# Patient Record
Sex: MALE | Race: WHITE | Employment: UNEMPLOYED | URBAN - METROPOLITAN AREA
[De-identification: names, ages, dates, MRNs, and addresses within clinical notes are randomized per-mention and may not be internally consistent; named-entity substitution may affect disease eponyms.]

---

## 2020-05-27 ENCOUNTER — HOSPITAL ENCOUNTER (EMERGENCY)
Facility: CLINIC | Age: 35
Discharge: OTHER INSTITUTION | End: 2020-05-27
Attending: FAMILY MEDICINE | Admitting: FAMILY MEDICINE
Payer: MEDICAID

## 2020-05-27 ENCOUNTER — TELEPHONE (OUTPATIENT)
Dept: BEHAVIORAL HEALTH | Facility: CLINIC | Age: 35
End: 2020-05-27

## 2020-05-27 ENCOUNTER — HOSPITAL ENCOUNTER (INPATIENT)
Facility: HOSPITAL | Age: 35
LOS: 5 days | Discharge: HOME | End: 2020-06-01
Attending: PSYCHIATRY & NEUROLOGY | Admitting: PSYCHIATRY & NEUROLOGY
Payer: MEDICAID

## 2020-05-27 VITALS
SYSTOLIC BLOOD PRESSURE: 159 MMHG | HEART RATE: 74 BPM | DIASTOLIC BLOOD PRESSURE: 100 MMHG | RESPIRATION RATE: 16 BRPM | TEMPERATURE: 98.1 F | OXYGEN SATURATION: 100 %

## 2020-05-27 DIAGNOSIS — R45.851 SUICIDAL IDEATION: ICD-10-CM

## 2020-05-27 DIAGNOSIS — F41.1 GAD (GENERALIZED ANXIETY DISORDER): Primary | ICD-10-CM

## 2020-05-27 DIAGNOSIS — F22 DELUSION (H): ICD-10-CM

## 2020-05-27 DIAGNOSIS — F32.A DEPRESSION, UNSPECIFIED DEPRESSION TYPE: ICD-10-CM

## 2020-05-27 DIAGNOSIS — F41.9 ANXIETY: ICD-10-CM

## 2020-05-27 PROBLEM — R45.89 SUICIDAL BEHAVIOR: Status: ACTIVE | Noted: 2020-05-27

## 2020-05-27 LAB
ALBUMIN SERPL-MCNC: 4.2 G/DL (ref 3.4–5)
ALP SERPL-CCNC: 66 U/L (ref 40–150)
ALT SERPL W P-5'-P-CCNC: 23 U/L (ref 0–70)
AMPHETAMINES UR QL SCN: NEGATIVE
ANION GAP SERPL CALCULATED.3IONS-SCNC: 7 MMOL/L (ref 3–14)
APAP SERPL-MCNC: <2 MG/L (ref 10–20)
AST SERPL W P-5'-P-CCNC: 12 U/L (ref 0–45)
BARBITURATES UR QL: NEGATIVE
BASOPHILS # BLD AUTO: 0 10E9/L (ref 0–0.2)
BASOPHILS NFR BLD AUTO: 0.1 %
BENZODIAZ UR QL: NEGATIVE
BILIRUB SERPL-MCNC: 0.4 MG/DL (ref 0.2–1.3)
BUN SERPL-MCNC: 15 MG/DL (ref 7–30)
CALCIUM SERPL-MCNC: 9.3 MG/DL (ref 8.5–10.1)
CANNABINOIDS UR QL SCN: NEGATIVE
CHLORIDE SERPL-SCNC: 108 MMOL/L (ref 94–109)
CO2 SERPL-SCNC: 24 MMOL/L (ref 20–32)
COCAINE UR QL: NEGATIVE
CREAT SERPL-MCNC: 0.75 MG/DL (ref 0.66–1.25)
DIFFERENTIAL METHOD BLD: ABNORMAL
EOSINOPHIL # BLD AUTO: 0.2 10E9/L (ref 0–0.7)
EOSINOPHIL NFR BLD AUTO: 1.2 %
ERYTHROCYTE [DISTWIDTH] IN BLOOD BY AUTOMATED COUNT: 12.8 % (ref 10–15)
ETHANOL SERPL-MCNC: <0.01 G/DL
ETHANOL UR QL SCN: NEGATIVE
GFR SERPL CREATININE-BSD FRML MDRD: >90 ML/MIN/{1.73_M2}
GLUCOSE SERPL-MCNC: 114 MG/DL (ref 70–99)
HCT VFR BLD AUTO: 46.8 % (ref 40–53)
HGB BLD-MCNC: 16.5 G/DL (ref 13.3–17.7)
IMM GRANULOCYTES # BLD: 0 10E9/L (ref 0–0.4)
IMM GRANULOCYTES NFR BLD: 0.2 %
LYMPHOCYTES # BLD AUTO: 1.9 10E9/L (ref 0.8–5.3)
LYMPHOCYTES NFR BLD AUTO: 14.1 %
MCH RBC QN AUTO: 32.5 PG (ref 26.5–33)
MCHC RBC AUTO-ENTMCNC: 35.3 G/DL (ref 31.5–36.5)
MCV RBC AUTO: 92 FL (ref 78–100)
MONOCYTES # BLD AUTO: 1 10E9/L (ref 0–1.3)
MONOCYTES NFR BLD AUTO: 7.9 %
NEUTROPHILS # BLD AUTO: 10 10E9/L (ref 1.6–8.3)
NEUTROPHILS NFR BLD AUTO: 76.5 %
NRBC # BLD AUTO: 0 10*3/UL
NRBC BLD AUTO-RTO: 0 /100
OPIATES UR QL SCN: NEGATIVE
PLATELET # BLD AUTO: 310 10E9/L (ref 150–450)
POTASSIUM SERPL-SCNC: 3.7 MMOL/L (ref 3.4–5.3)
PROT SERPL-MCNC: 7.4 G/DL (ref 6.8–8.8)
RBC # BLD AUTO: 5.08 10E12/L (ref 4.4–5.9)
SALICYLATES SERPL-MCNC: 4 MG/DL
SARS-COV-2 PCR COMMENT: NORMAL
SARS-COV-2 RNA SPEC QL NAA+PROBE: NEGATIVE
SARS-COV-2 RNA SPEC QL NAA+PROBE: NORMAL
SODIUM SERPL-SCNC: 139 MMOL/L (ref 133–144)
SPECIMEN SOURCE: NORMAL
SPECIMEN SOURCE: NORMAL
WBC # BLD AUTO: 13.1 10E9/L (ref 4–11)

## 2020-05-27 PROCEDURE — 99284 EMERGENCY DEPT VISIT MOD MDM: CPT | Mod: Z6 | Performed by: FAMILY MEDICINE

## 2020-05-27 PROCEDURE — 80053 COMPREHEN METABOLIC PANEL: CPT | Performed by: FAMILY MEDICINE

## 2020-05-27 PROCEDURE — 80307 DRUG TEST PRSMV CHEM ANLYZR: CPT | Performed by: FAMILY MEDICINE

## 2020-05-27 PROCEDURE — 80320 DRUG SCREEN QUANTALCOHOLS: CPT | Performed by: FAMILY MEDICINE

## 2020-05-27 PROCEDURE — 12400000 ZZH R&B MH

## 2020-05-27 PROCEDURE — U0003 INFECTIOUS AGENT DETECTION BY NUCLEIC ACID (DNA OR RNA); SEVERE ACUTE RESPIRATORY SYNDROME CORONAVIRUS 2 (SARS-COV-2) (CORONAVIRUS DISEASE [COVID-19]), AMPLIFIED PROBE TECHNIQUE, MAKING USE OF HIGH THROUGHPUT TECHNOLOGIES AS DESCRIBED BY CMS-2020-01-R: HCPCS | Performed by: FAMILY MEDICINE

## 2020-05-27 PROCEDURE — 99285 EMERGENCY DEPT VISIT HI MDM: CPT | Mod: 25 | Performed by: FAMILY MEDICINE

## 2020-05-27 PROCEDURE — 25000132 ZZH RX MED GY IP 250 OP 250 PS 637: Performed by: NURSE PRACTITIONER

## 2020-05-27 PROCEDURE — 80329 ANALGESICS NON-OPIOID 1 OR 2: CPT | Performed by: FAMILY MEDICINE

## 2020-05-27 PROCEDURE — 85025 COMPLETE CBC W/AUTO DIFF WBC: CPT | Performed by: FAMILY MEDICINE

## 2020-05-27 PROCEDURE — 80329 ANALGESICS NON-OPIOID 1 OR 2: CPT | Mod: 91 | Performed by: FAMILY MEDICINE

## 2020-05-27 PROCEDURE — 90791 PSYCH DIAGNOSTIC EVALUATION: CPT

## 2020-05-27 PROCEDURE — 25000132 ZZH RX MED GY IP 250 OP 250 PS 637: Performed by: FAMILY MEDICINE

## 2020-05-27 RX ORDER — OLANZAPINE 10 MG/2ML
5-10 INJECTION, POWDER, FOR SOLUTION INTRAMUSCULAR 3 TIMES DAILY PRN
Status: DISCONTINUED | OUTPATIENT
Start: 2020-05-27 | End: 2020-06-01 | Stop reason: HOSPADM

## 2020-05-27 RX ORDER — ACETAMINOPHEN 325 MG/1
650 TABLET ORAL EVERY 4 HOURS PRN
Status: DISCONTINUED | OUTPATIENT
Start: 2020-05-27 | End: 2020-06-01 | Stop reason: HOSPADM

## 2020-05-27 RX ORDER — OLANZAPINE 5 MG/1
5-10 TABLET ORAL 3 TIMES DAILY PRN
Status: DISCONTINUED | OUTPATIENT
Start: 2020-05-27 | End: 2020-06-01 | Stop reason: HOSPADM

## 2020-05-27 RX ORDER — OLANZAPINE 10 MG/1
10 TABLET, ORALLY DISINTEGRATING ORAL ONCE
Status: COMPLETED | OUTPATIENT
Start: 2020-05-27 | End: 2020-05-27

## 2020-05-27 RX ORDER — HYDROXYZINE HYDROCHLORIDE 25 MG/1
25-50 TABLET, FILM COATED ORAL EVERY 4 HOURS PRN
Status: DISCONTINUED | OUTPATIENT
Start: 2020-05-27 | End: 2020-06-01 | Stop reason: HOSPADM

## 2020-05-27 RX ORDER — ALUMINA, MAGNESIA, AND SIMETHICONE 2400; 2400; 240 MG/30ML; MG/30ML; MG/30ML
30 SUSPENSION ORAL EVERY 4 HOURS PRN
Status: DISCONTINUED | OUTPATIENT
Start: 2020-05-27 | End: 2020-06-01 | Stop reason: HOSPADM

## 2020-05-27 RX ADMIN — HYDROXYZINE HYDROCHLORIDE 50 MG: 25 TABLET ORAL at 23:27

## 2020-05-27 RX ADMIN — OLANZAPINE 10 MG: 10 TABLET, ORALLY DISINTEGRATING ORAL at 16:31

## 2020-05-27 ASSESSMENT — ACTIVITIES OF DAILY LIVING (ADL)
COGNITION: 0 - NO COGNITION ISSUES REPORTED
DRESS: 0-->INDEPENDENT
FALL_HISTORY_WITHIN_LAST_SIX_MONTHS: NO
TOILETING: 0-->INDEPENDENT
AMBULATION: 0-->INDEPENDENT
TRANSFERRING: 0-->INDEPENDENT
RETIRED_COMMUNICATION: 0-->UNDERSTANDS/COMMUNICATES WITHOUT DIFFICULTY
BATHING: 0-->INDEPENDENT
RETIRED_EATING: 0-->INDEPENDENT
SWALLOWING: 0-->SWALLOWS FOODS/LIQUIDS WITHOUT DIFFICULTY

## 2020-05-27 ASSESSMENT — MIFFLIN-ST. JEOR: SCORE: 1587.63

## 2020-05-27 ASSESSMENT — ENCOUNTER SYMPTOMS
DYSPHORIC MOOD: 1
COLOR CHANGE: 1

## 2020-05-27 NOTE — ED NOTES
Pt given 72hr hold form. Pt was unhappy, stating he's been in the room for hours and no one has spoken to him. Nurse reminded him that DEC  had spoken with him on iPad and MD had also seen him. Pt states understanding of rights under 72hr hold.

## 2020-05-27 NOTE — ED NOTES
Pt states that he thinks he has been sick for three months. Pt states the palms of his hands have been discolored and pt believes he is losing pigment on his face. Nurse noted palms did look mildly blotchy, but nothing outside of normal skin variation, and no discoloration or loss of pigment noted on face. Pt feels distraught and believes he is slowly dying. Pt states no n/v, and no problems with elimination. Emotionally he has been feeling depressed and embarrassed by the percieved illness.

## 2020-05-27 NOTE — ED TRIAGE NOTES
Patient has been Suicdal since sunday , took 40 tabs of advil at noon last sunday ( 5/24/20) in an attempt to end his life. Patients sister called a crisis hotline and they recommended patient to come to the ED for eval. Patient claims he is less suicidal now.

## 2020-05-27 NOTE — ED PROVIDER NOTES
"    [facility] EMERGENCY DEPARTMENT ([facility])     May 27, 2020  History     Chief Complaint   Patient presents with     Suicidal     Patient has been Suicdal since sunday , took 40 tabs of advil at noon last sunday ( 5/24/20) in an attempt to end his life. Patient also attempted to hang self.      The history is provided by the patient, a parent and medical records.     John R Budinger is a 34 year old male with a medical history significant for anxiety who presents to the ED today for suicidal ideations.  Patient's family reportedly brought him him as they were worried for his wellbeing.  Patient reportedly has no formal mental health history, however he had some anxiety in his early 20s but never sought treatment and it went away.  Patient reports that he is fearful that he is very sick and fearful that he is \"rotting\".  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  Patient was checked with blood work and nothing was found.  Patient then started noticing red hands and a priscilla on his face so he went to [facility] where they did not find anything significant.  [facility] encouraged anti-anxiety medication, but patient does not have insurance.  Patient feels he is dying and needs to figure out what is wrong with him in order to get better.  Patient recently decided he would take matters into his own hands and hung himself in his garage, but after kicking the chair out from underneath him, the rope untied.  Patient then tried to overdose on Tylenol unsuccessfully.  Patient has a history of smoking marijuana daily, however he reports he has not smoked for a month.  Patient does not want admission.  Patient would not give names and numbers of his family for collateral.    I have reviewed the Medications, Allergies, Past Medical and Surgical History, and Social History in the Personally system.  PAST MEDICAL HISTORY:   Past Medical " "History:   Diagnosis Date     Anxiety        PAST SURGICAL HISTORY: History reviewed. No pertinent surgical history.    Past medical history, past surgical history, medications, and allergies were reviewed with the patient. Additional pertinent items: None    FAMILY HISTORY: No family history on file.    SOCIAL HISTORY:   Social History     Tobacco Use     Smoking status: Current Every Day Smoker     Packs/day: 1.00     Smokeless tobacco: Never Used   Substance Use Topics     Alcohol use: Not Currently     Social history was reviewed with the patient. Additional pertinent items: None      There are no discharge medications for this patient.       No Known Allergies     Review of Systems   Skin: Positive for color change (\"red\" hands).   Psychiatric/Behavioral: Positive for behavioral problems, dysphoric mood, self-injury and suicidal ideas.   All other systems reviewed and are negative.    A complete review of systems was performed with pertinent positives and negatives noted in the HPI, and all other systems negative.       Physical Exam   BP: (!) 172/109  Pulse: 74  Heart Rate: 88  Temp: 96.8  F (36  C)  Resp: 16  SpO2: 100 %      Physical Exam  Constitutional:       General: He is not in acute distress.     Appearance: He is not diaphoretic.   HENT:      Head: Atraumatic.   Eyes:      General: No scleral icterus.     Pupils: Pupils are equal, round, and reactive to light.   Cardiovascular:      Heart sounds: Normal heart sounds.   Pulmonary:      Effort: No respiratory distress.      Breath sounds: Normal breath sounds.   Abdominal:      General: Bowel sounds are normal.      Palpations: Abdomen is soft.      Tenderness: There is no abdominal tenderness.   Musculoskeletal:         General: No tenderness.   Skin:     General: Skin is warm.      Findings: No rash.   Neurological:      General: No focal deficit present.      Mental Status: He is oriented to person, place, and time.      Cranial Nerves: No cranial " nerve deficit.      Motor: No weakness.      Coordination: Coordination normal.   Psychiatric:         Mood and Affect: Mood is anxious and depressed.         Behavior: Behavior is cooperative.         Thought Content: Thought content is paranoid and delusional. Thought content includes suicidal ideation. Thought content includes suicidal plan.         ED Course   12:10 PM  The patient was seen and examined by Giancarlo Rodriguez MD in Room ED16C.        Procedures       Patient was seen and evaluated by the  please refer to their documentation in the epic chart dated 5/27/2020                Results for orders placed or performed during the hospital encounter of 05/27/20 (from the past 24 hour(s))   CBC with platelets differential   Result Value Ref Range    WBC 13.1 (H) 4.0 - 11.0 10e9/L    RBC Count 5.08 4.4 - 5.9 10e12/L    Hemoglobin 16.5 13.3 - 17.7 g/dL    Hematocrit 46.8 40.0 - 53.0 %    MCV 92 78 - 100 fl    MCH 32.5 26.5 - 33.0 pg    MCHC 35.3 31.5 - 36.5 g/dL    RDW 12.8 10.0 - 15.0 %    Platelet Count 310 150 - 450 10e9/L    Diff Method Automated Method     % Neutrophils 76.5 %    % Lymphocytes 14.1 %    % Monocytes 7.9 %    % Eosinophils 1.2 %    % Basophils 0.1 %    % Immature Granulocytes 0.2 %    Nucleated RBCs 0 0 /100    Absolute Neutrophil 10.0 (H) 1.6 - 8.3 10e9/L    Absolute Lymphocytes 1.9 0.8 - 5.3 10e9/L    Absolute Monocytes 1.0 0.0 - 1.3 10e9/L    Absolute Eosinophils 0.2 0.0 - 0.7 10e9/L    Absolute Basophils 0.0 0.0 - 0.2 10e9/L    Abs Immature Granulocytes 0.0 0 - 0.4 10e9/L    Absolute Nucleated RBC 0.0    Comprehensive metabolic panel   Result Value Ref Range    Sodium 139 133 - 144 mmol/L    Potassium 3.7 3.4 - 5.3 mmol/L    Chloride 108 94 - 109 mmol/L    Carbon Dioxide 24 20 - 32 mmol/L    Anion Gap 7 3 - 14 mmol/L    Glucose 114 (H) 70 - 99 mg/dL    Urea Nitrogen 15 7 - 30 mg/dL    Creatinine 0.75 0.66 - 1.25 mg/dL    GFR Estimate >90 >60 mL/min/[1.73_m2]    GFR Estimate If  Black >90 >60 mL/min/[1.73_m2]    Calcium 9.3 8.5 - 10.1 mg/dL    Bilirubin Total 0.4 0.2 - 1.3 mg/dL    Albumin 4.2 3.4 - 5.0 g/dL    Protein Total 7.4 6.8 - 8.8 g/dL    Alkaline Phosphatase 66 40 - 150 U/L    ALT 23 0 - 70 U/L    AST 12 0 - 45 U/L   Acetaminophen level   Result Value Ref Range    Acetaminophen Level <2 mg/L   Salicylate level   Result Value Ref Range    Salicylate Level 4 mg/dL   Alcohol ethyl   Result Value Ref Range    Ethanol g/dL <0.01 <0.01 g/dL   Drug abuse screen 6 urine (chem dep)   Result Value Ref Range    Amphetamine Qual Urine Negative NEG^Negative    Barbiturates Qual Urine Negative NEG^Negative    Benzodiazepine Qual Urine Negative NEG^Negative    Cannabinoids Qual Urine Negative NEG^Negative    Cocaine Qual Urine Negative NEG^Negative    Ethanol Qual Urine Negative NEG^Negative    Opiates Qualitative Urine Negative NEG^Negative   Asymptomatic COVID-19 Virus (Coronavirus) by PCR    Specimen: Nasopharyngeal   Result Value Ref Range    COVID-19 Virus PCR to [facility] - Source Nasopharyngeal     COVID-19 Virus PCR to [facility] - Result       Test received-See reflex to IDDL test SARS CoV2 (COVID-19) Virus RT-PCR   SARS-CoV-2 COVID-19 Virus (Coronavirus) RT-PCR Nasopharyngeal    Specimen: Nasopharyngeal   Result Value Ref Range    SARS-CoV-2 Virus Specimen Source Nasopharyngeal     SARS-CoV-2 PCR Result NEGATIVE     SARS-CoV-2 PCR Comment       This automated, real-time RT-PCR assay by [employer] on the [employer] has   been given Emergency Use Authorization (EUA) for the in vitro qualitative detection of RNA   from the SARS-CoV2 virus in nasopharyngeal swabs in viral transport medium from patients   with signs and symptoms of infection who are suspected of COVID-19. The performance is   unknown in asymptomatic patients.       Medications   OLANZapine zydis (zyPREXA) ODT tab 10 mg (10 mg Oral Given 5/27/20 8784)             Assessments & Plan (with Medical Decision Making)          I have reviewed the nursing notes.    I have reviewed the findings, diagnosis, plan and need for follow up with the patient.    Patient with fixed delusions ongoing depression anxiety suicidal statements patient will be placed on a 72-hour hold and will be admitted unfortunately there are no beds at [facility] patient will be transported to [facility] for further evaluation and stabilization.    Final diagnoses:   Delusion (H)   Suicidal ideation   Depression, unspecified depression type   Anxiety   I, Hang Almanzar, am serving as a trained medical scribe to document services personally performed by Giancarlo Rodriguez MD, based on the provider's statements to me.     I, Giancarlo Rodriguez MD, was physically present and have reviewed and verified the accuracy of this note documented by Hang Almanzar.      5/27/2020   [facility] [place], EMERGENCY DEPARTMENT     Giancarlo Rodriguez MD  05/28/20 1000

## 2020-05-27 NOTE — TELEPHONE ENCOUNTER
S:  Pt was directed to the ED by [facility]IS due to anxiety and possible delusions.    B:  Info per Lion of [facility] , [phone] :  Lion spoke with pt over the phone today due to pts very high anxiety and possible delusional thinking.  Pt states he is very worried about numerous perceived medical issues.  He has been to ER's several times re this and nothing has been found.  He reports he tried to kill himself twice on Sunday.  He 1 st tried to hang himself and the rope failed.  He reports he fell and hit his head and passed out.  When he awoke, he states he took 40 Advil.  He denies any previous MH issues.  This has been occurring over the last 3 months or so.    A:  Needing further assessment.    R:  Directed to the ED.
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
## 2020-05-28 PROCEDURE — 99223 1ST HOSP IP/OBS HIGH 75: CPT | Mod: GT | Performed by: NURSE PRACTITIONER

## 2020-05-28 PROCEDURE — 12400000 ZZH R&B MH

## 2020-05-28 PROCEDURE — 99221 1ST HOSP IP/OBS SF/LOW 40: CPT | Mod: GT | Performed by: NURSE PRACTITIONER

## 2020-05-28 PROCEDURE — 25000132 ZZH RX MED GY IP 250 OP 250 PS 637: Performed by: NURSE PRACTITIONER

## 2020-05-28 RX ORDER — TRIAMCINOLONE ACETONIDE 0.25 MG/G
CREAM TOPICAL 2 TIMES DAILY
Status: ON HOLD | COMMUNITY
Start: 2020-05-08 | End: 2020-06-01

## 2020-05-28 RX ORDER — TRIAMCINOLONE ACETONIDE 0.25 MG/G
CREAM TOPICAL 2 TIMES DAILY PRN
Status: DISCONTINUED | OUTPATIENT
Start: 2020-05-28 | End: 2020-05-28

## 2020-05-28 RX ORDER — MINERAL OIL/HYDROPHIL PETROLAT
OINTMENT (GRAM) TOPICAL 2 TIMES DAILY PRN
Status: DISCONTINUED | OUTPATIENT
Start: 2020-05-28 | End: 2020-06-01 | Stop reason: HOSPADM

## 2020-05-28 RX ORDER — HYDROXYZINE PAMOATE 25 MG/1
25 CAPSULE ORAL EVERY 6 HOURS PRN
Status: ON HOLD | COMMUNITY
Start: 2020-05-05 | End: 2020-06-01

## 2020-05-28 RX ORDER — QUETIAPINE FUMARATE 25 MG/1
25-50 TABLET, FILM COATED ORAL 3 TIMES DAILY PRN
Status: DISCONTINUED | OUTPATIENT
Start: 2020-05-28 | End: 2020-06-01 | Stop reason: HOSPADM

## 2020-05-28 RX ORDER — PROPRANOLOL HYDROCHLORIDE 10 MG/1
10 TABLET ORAL 3 TIMES DAILY PRN
Status: DISCONTINUED | OUTPATIENT
Start: 2020-05-28 | End: 2020-06-01 | Stop reason: HOSPADM

## 2020-05-28 RX ADMIN — QUETIAPINE FUMARATE 50 MG: 25 TABLET ORAL at 18:24

## 2020-05-28 ASSESSMENT — ACTIVITIES OF DAILY LIVING (ADL)
LAUNDRY: UNABLE TO COMPLETE
HYGIENE/GROOMING: INDEPENDENT
ORAL_HYGIENE: INDEPENDENT
DRESS: INDEPENDENT
ORAL_HYGIENE: INDEPENDENT
DRESS: INDEPENDENT;SCRUBS (BEHAVIORAL HEALTH)
HYGIENE/GROOMING: INDEPENDENT
LAUNDRY: UNABLE TO COMPLETE
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
## 2020-05-28 NOTE — PLAN OF CARE
Spoke with pt's sister, Katiuska, who called for an update - Katiuska is on pt's ALAN. Updated was provided and Katiuska reported that pt's anxiety has gotten worse over the past few months and is unsure if covid has made it worse. Katiuska said pt has been getting upset over nobody believing him about his medical concerns. Katiuska did not have other concerns at this time for pt.

## 2020-05-28 NOTE — PLAN OF CARE
Face to face shift report received from [place]. Rounding completed, pt observed.    Problem: Adult Behavioral Health Plan of Care  Goal: Patient-Specific Goal (Individualization)  Description: Pt will sleep 6-8 hours each night.   Pt will participate in at least 50% of groups.  Pt will eat at least 50% of meals.  Pt will remain compliant with assessments.  5/28/2020 1548 by Mannie Melo RN  Outcome: No Change  Note: Patient is calm and cooperative. At the start of shift patient was in group room. Patient has ongoing anxiety about his thyroid. Thinking he has cancer in his thyroid. Patient ate well. Is compliant with treatment team and medications. Attended groups all shift. Had a talk with him about different thyroid conditions that seemed to have a slight calming effect.     Problem: Suicide Risk  Goal: Absence of Self-Harm  Description: Pt will remain free of self harm.  5/28/2020 1548 by Mannie Melo RN  Outcome: Improving  Patient was free from injury/harm this shift.     Face to face report will be communicated to oncoming RN.    Mannie Melo RN  5/28/2020  3:49 PM

## 2020-05-28 NOTE — PLAN OF CARE
"ADMISSION NOTE    Reason for admission suicide attempt.  Safety concerns none.  Risk for or history of violence none reported.   Full skin assessment: WDL    Patient arrived on unit from [facility] accompanied by EMS staff on 5/27/2020  8:30 PM.   Status on arrival: cooperative,  BP (!) 148/108   Pulse 78   Temp 97.7  F (36.5  C) (Tympanic)   Resp 16   Ht 1.727 m (5' 8\")   Wt 67.8 kg (149 lb 8 oz)   SpO2 99%   BMI 22.73 kg/m    Patient given tour of unit and Welcome to  unit papers given to patient, wanding completed, belongings inventoried, and admission assessment completed.   Patient's legal status on arrival is 72 Hour Hold. Appropriate legal rights discussed with and copy given to patient. Patient Bill of Rights discussed with and copy given to patient.   Patient denies SI, HI, and thoughts of self harm and contracts for safety while on unit.      Luz Marina Cramer RN  5/27/2020  9:50 PM    Per report, pt's sister called hotline after finding out that pt took 40 tabs of Advil. Per chart review prior to this pt had attempted hanging but the rope broke then took the Advil and had plans to \"walk till he dropped\" which was about 8 hours.     Pt states he is no longer suicidal and only became suicidal after being sick for three months and not being able to receive medical care. Pt states he doesn't \"feel physically right\" and couldn't get into a dermatologist until July. Pt states the palms of his hands are red and that he has a priscilla on his face which were not visible to this writer. Pt states his \"health was fading\" and he was \"struggling\" and that his body is \"breaking down\". Pt states because of this he has been dealing with \"extreme anxiety\" but he has been \"open\" with his family. Denies HI, AH, VH and does not believe he needs to be here. Pt states he thought he was going to a medical floor and that being here will only hurt him more.    States he is not on medications and does not have a pharmacy. Lives with " brother and brother's girlfriend.

## 2020-05-28 NOTE — PLAN OF CARE
"Face to face shift report received from Serena CABRAL RN. Patient observed.      Problem: Adult Behavioral Health Plan of Care  Goal: Patient-Specific Goal (Individualization)  Description: Pt will sleep 6-8 hours each night.   Pt will participate in at least 50% of groups.  Pt will eat at least 50% of meals.  Pt will remain compliant with assessments.  5/28/2020 0950 by Lauren Chavarria RN  Outcome: Improving  Note: Patient is up in the lounge eating breakfast this morning. He sits in the corner of the lounge. Pt shows nurse marks on his face that he reports he is concerned about, he states he has also felt fatigued, and has had swelling in his arm pit and neck. He states he was concerned about this and unable to see a doctor due to the epidemic. He states \"I know I went too far by attempting suicide, I just felt like I was backed into a corner and I was going to slowly die from this cause no one will address this\" pt reports this was too far and wouldn't do it again. He states he plans to try  to not bother us too much. He states that he feels he needs to see a medical doctor, stating he just wants a diagnosis. He reports missing his family and wants to get home so he can get to a medical doctor to address his physical health.        Problem: Suicide Risk  Goal: Absence of Self-Harm  Description: Pt will remain free of self harm.    Pt denies SI, he reports Anxiety 5/10 due to his health condition and not being able to address this as he is here in a psych unit not a medical unit. Pt is states he just wants medical attention and does not feel he needs to be here.   5/28/2020 0950 by Lauren Chavarria, RN  Outcome: No Change   The face to face report will be communicated to on coming RN.       "

## 2020-05-28 NOTE — H&P
"Psychiatric Eval/H&P  Patient Name: John R Budinger   YOB: 1985  Age: 34 year old  [id number]    Primary Physician: No Ref-Primary, Physician   Completed By: Yudith Verma NP     CC: \"my health has been fading for the last 3 months\"    HPI   John R Budinger is a 34 year old single  male who was brought to the [facility] ED by a family member for evaluation of suicidal ideation. He reported that he had been feeling suicidal since Sunday. He was going to hang himself in his garage but he could not get the rope tied. He then ingested about 40 tablets of Advil. In ED he reported feeling that he was \"rotting from the inside\", reports he sees red marks on his face and hands that started 3 months ago. He was worried that he might have been stuck by a needle at a previous job, though has seen primary care and all tests have been unremarkable. He reports that he feels he is dying. He reported that he decided he would take matters into his own hands. He was not agreeable to admission so was placed on a 72 hour hold and transferred to behavioral health for further evaluation.    Robert reports that his health has been fading for the last 3 months. He states that he has noticed a reddened \"rash\" on his hands and face. He also feels that his lymph nodes are swollen. He states that he has gone to see a medical doctor and they were unable to tell him what was wrong. He was referred to dermatology and had a telemedicine visit on 5/8/20, which indicated \"ill defined hypopigmented patch on right cheek\". He was prescribed Kenalog cream. It was recommended he return in 2 months. He remains fixated on what he perceives as a medical condition that is making his skin \"rot\". 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 PCP did order labs that included HIV, Hep C, Hep B, chlamydia, gonorrhea which were negative. He reports feeling that no one is taking him " "seriously, which is what prompted him to feel suicidal. He does report that he feels ashamed that he is here, states \"I know I went too far, I just felt like I was backed into a corner\". He reports feeling remorseful for his actions, indicating that he does not want to do anything to hurt his family. States that he is very close with family, currently lives with his brother. He denies any suicidal ideation today. He denies a history of depression except over the last 2-3 months. States that he has experienced anxiety before though never to this extent. Reports he sleeps \"decently\", usually an average of 8 hours. Appetite has been good, denies any hallucinations.     He did meet with EBENEZER Funez who indicated that there did not see to be any visible rash. She ordered aquaphor for dry skin. He does not believe that he needs to take a medication daily, though does want to try something for as needed anxiety. He agrees to try Hydroxyzine today. He also is agreeable to see a therapist, so will ensure that he has a follow-up with PCP and with therapist prior to discharge.      [facility]     Past Psychiatric History:   No previous psychiatric admissions, no prior suicide attempts. Does report a history of anxiety that started in his 20s, though did not seek treatment. No outpatient MH providers. Has never taken any medications for anxiety or mood.     Social History:   Grew up in [place] with parents and siblings. Reports that his family is very supportive. Dropped out of school in 10th grade, has not obtained GED. He is currently living with his brother, his brother's girlfriend, and another friend. 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 No current income. Denies any legal issues.     Chemical Use History:   He reports that he quit smoking marijuana about a month ago, had been a daily user. No other illicit substances, reports occasional alcohol use on weekends. Urine drug screen " "negative.     Family Psychiatric History:   Denies.       MSE/PSYCH  PSYCHIATRIC EXAM  /90   Pulse 85   Temp 96.4  F (35.8  C) (Tympanic)   Resp 18   Ht 1.727 m (5' 8\")   Wt 67.8 kg (149 lb 8 oz)   SpO2 97%   BMI 22.73 kg/m    -Appearance/Behavior: No apparent distress, Casually groomed and Appears stated age    -Attitude:pleasant, cooperative  -Motor: normal or unremarkable.  -Gait: Normal.    -Abnormal involuntary movements: none noted.  -Mood: anxious and worried.  -Affect: mood congruent  -Speech: clear, coherent                -Thought process/associations: Linear and Goal directed, perseverative  -Thought content: questionable delusional thoughts, denies hallucinations  -Perceptual disturbances: No hallucinations..              -Suicidal/Homicidal Ideation: denies any suicidal or homicidal ideation  -Judgment: Limited.  -Insight: Limited.  *Orientation: time, place and person.  *Memory: intact  *Attention: Adequate for interview  *Language: fluent, no aphasias, able to repeat phrases and name objects. Vocab intact.  *Fund of information: appropriate for education  *Cognitive functioning estimate: 0 - independent.          Medical Review of Systems:   Medical History and ROS  Prior to Admission medications    Medication Sig Start Date End Date Taking? Authorizing Provider   hydrOXYzine (VISTARIL) 25 MG capsule Take 25 mg by mouth every 6 hours as needed 5/5/20  Yes Reported, Patient   triamcinolone (KENALOG) 0.025 % cream Apply topically 2 times daily to rash on face. 5/8/20  Yes Reported, Patient     No Known Allergies  Past Medical History:   Diagnosis Date     Anxiety      No past surgical history on file.      Physical Exam and Review of Systems- see H&P completed by Violeta Cameron CNP. Reviewed with no notable discrepancies.     Labs:   Results for orders placed or performed during the hospital encounter of 05/27/20 (from the past 48 hour(s))   CBC with platelets differential   Result Value Ref " Range    WBC 13.1 (H) 4.0 - 11.0 10e9/L    RBC Count 5.08 4.4 - 5.9 10e12/L    Hemoglobin 16.5 13.3 - 17.7 g/dL    Hematocrit 46.8 40.0 - 53.0 %    MCV 92 78 - 100 fl    MCH 32.5 26.5 - 33.0 pg    MCHC 35.3 31.5 - 36.5 g/dL    RDW 12.8 10.0 - 15.0 %    Platelet Count 310 150 - 450 10e9/L    Diff Method Automated Method     % Neutrophils 76.5 %    % Lymphocytes 14.1 %    % Monocytes 7.9 %    % Eosinophils 1.2 %    % Basophils 0.1 %    % Immature Granulocytes 0.2 %    Nucleated RBCs 0 0 /100    Absolute Neutrophil 10.0 (H) 1.6 - 8.3 10e9/L    Absolute Lymphocytes 1.9 0.8 - 5.3 10e9/L    Absolute Monocytes 1.0 0.0 - 1.3 10e9/L    Absolute Eosinophils 0.2 0.0 - 0.7 10e9/L    Absolute Basophils 0.0 0.0 - 0.2 10e9/L    Abs Immature Granulocytes 0.0 0 - 0.4 10e9/L    Absolute Nucleated RBC 0.0    Comprehensive metabolic panel   Result Value Ref Range    Sodium 139 133 - 144 mmol/L    Potassium 3.7 3.4 - 5.3 mmol/L    Chloride 108 94 - 109 mmol/L    Carbon Dioxide 24 20 - 32 mmol/L    Anion Gap 7 3 - 14 mmol/L    Glucose 114 (H) 70 - 99 mg/dL    Urea Nitrogen 15 7 - 30 mg/dL    Creatinine 0.75 0.66 - 1.25 mg/dL    GFR Estimate >90 >60 mL/min/[1.73_m2]    GFR Estimate If Black >90 >60 mL/min/[1.73_m2]    Calcium 9.3 8.5 - 10.1 mg/dL    Bilirubin Total 0.4 0.2 - 1.3 mg/dL    Albumin 4.2 3.4 - 5.0 g/dL    Protein Total 7.4 6.8 - 8.8 g/dL    Alkaline Phosphatase 66 40 - 150 U/L    ALT 23 0 - 70 U/L    AST 12 0 - 45 U/L   Acetaminophen level   Result Value Ref Range    Acetaminophen Level <2 mg/L   Salicylate level   Result Value Ref Range    Salicylate Level 4 mg/dL   Alcohol ethyl   Result Value Ref Range    Ethanol g/dL <0.01 <0.01 g/dL   Drug abuse screen 6 urine (chem dep)   Result Value Ref Range    Amphetamine Qual Urine Negative NEG^Negative    Barbiturates Qual Urine Negative NEG^Negative    Benzodiazepine Qual Urine Negative NEG^Negative    Cannabinoids Qual Urine Negative NEG^Negative    Cocaine Qual Urine Negative  "NEG^Negative    Ethanol Qual Urine Negative NEG^Negative    Opiates Qualitative Urine Negative NEG^Negative   Asymptomatic COVID-19 Virus (Coronavirus) by PCR    Specimen: Nasopharyngeal   Result Value Ref Range    COVID-19 Virus PCR to [facility] - Source Nasopharyngeal     COVID-19 Virus PCR to [facility] - Result       Test received-See reflex to IDDL test SARS CoV2 (COVID-19) Virus RT-PCR   SARS-CoV-2 COVID-19 Virus (Coronavirus) RT-PCR Nasopharyngeal    Specimen: Nasopharyngeal   Result Value Ref Range    SARS-CoV-2 Virus Specimen Source Nasopharyngeal     SARS-CoV-2 PCR Result NEGATIVE     SARS-CoV-2 PCR Comment       This automated, real-time RT-PCR assay by [employer] on the [employer] has   been given Emergency Use Authorization (EUA) for the in vitro qualitative detection of RNA   from the SARS-CoV2 virus in nasopharyngeal swabs in viral transport medium from patients   with signs and symptoms of infection who are suspected of COVID-19. The performance is   unknown in asymptomatic patients.        TSH on 5/5/20 was 1.8.  HIV, Hep C, Hep B, chlamydia, gonorrhea- all negative on 5/5/20      Assessment/Impression: This is a 34 year old yo male with a history of anxiety. He was brought to the ED by family after ingesting roughly 40 tablets of Advil. He reports significant increase in anxiety over the last 3 months, with a belief that he has contracted some sort of disease that I making his skin \"rot\" and become discolored. He was seen by PCP and dermatology by video visit, all tests by PCP were unremarkable and dermatology only documented seeing \"ill defined hypopigmented patch on right cheek'. He was seen my our medical provider today with no reports of notable rash. We did discuss his anxiety, that getting this better controlled may help him feel less worried and distraught. He only wishes to try something as needed today. Will add Hydroxyzine, Propranolol, and small dose of Seroquel to see if he " "finds something that may be effective. He denies any suicidal thoughts today. He is agreeable to start seeing a therapist to work on managing anxiety and irrational beliefs. Will also ensure that he has follow-up with PCP, though did reassure him that the labs drawn in ED did not show any indication of any type of disease process currently.     Educated regarding medication indications, risks, benefits, side effects, contraindications and possible interactions. Verbally expressed understanding.     DX:  Adjustment disorder with mixed anxiety and depressed mood  Unspecified anxiety disorder, R/o ZACHARIAH  R/o illness anxiety disorder  R/o unspecified psychosis       Plan:  Admit to Unit: [address] [place] [address]  Attending: Yudith Verma CNP  Patient is on a 72 hour mental health hold  Labs unremarkable  Monitor for target symptoms: decrease in anxiety and worry   Provide a safe environment and therapeutic milieu.   Declining any scheduled medications  Can utilize PRN medication for anxiety- Hydroxyzine, Propranolol, Seroquel      Anticipated length of stay: 3-5 days       SHAWNEE Salgado CNP      Video Visit  John R Budinger is a 34 year old male who is being evaluated via a billable video visit.      The patient has been notified of following:     \"This video visit will be conducted via a call between you and your physician/provider. We have found that certain health care needs can be provided without the need for an in-person physical exam.  This service lets us provide the care you need with a video conversation.  If a prescription is necessary we can send it directly to your pharmacy.  If lab work is needed we can place an order for that and you can then stop by our lab to have the test done at a later time.    If during the course of the call the physician/provider feels a video visit is not appropriate, you will not be charged for this service.\"     Patient has given verbal consent for Video visit? Yes    Video Start " Time: 0930    I have reviewed and updated the patient's Past Medical History, Social History, Family History and Medication List.      Video-Visit Details    Type of service:  Video Visit    Video End Time (time video stopped): 0950    Originating Location (pt. Location): Other [facility] inpatient behavioral health unit    Distant Location (provider location):  [facility] remote provider    Mode of Communication:  Video Conference via [email or web address] Meeting liz

## 2020-05-28 NOTE — H&P
"[facility]    History and Physical  Medical Services       Date of Admission:  5/27/2020  Date of Service (when I saw the patient): 05/28/20    Assessment & Plan     Principal Problem:    Suicidal behavior     covid screening negative       Pt states reports that he went to the doctor a month ago for a \"rash\" on his face. He states he was prescribed kenalog but stopped using it after 3 days because it did not work. Pt points to areas on face where the \"rash\" is.  A rash is not visible. He also reports that he thinks his lymph noses are swollen all over. 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 Pt also points to palms of hands and states that they are red. No significant redness is noticed. Pt seems overly anxious and fearful about health.      Pt medically stable, no acute medical concerns. Chronic medical problems stable. Will sign off. Please consult for any new medical issues or concerns.       Code Status: Full Code    Violeta Cameron, CNP    Primary Care Physician   Physician No Ref-Primary    Chief Complaint   \"I was suicidal\"    History is obtained from the patient and medical chart     History of Present Illness   (per ED) John R Budinger is a 34 year old male with a medical history significant for anxiety who presents to the ED today for suicidal ideations.  Patient's family reportedly brought him him as they were worried for his wellbeing.  Patient reportedly has no formal mental health history, however he had some anxiety in his early 20s but never sought treatment and it went away.  Patient reports that he is fearful that he is very sick and fearful that he is \"rotting\".  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  Patient was checked with blood work and nothing was found.  Patient then started noticing red hands and a priscilla on his face so he went to [place] [facility] [place] did not find anything significant.  [facility] encouraged anti-anxiety medication, but patient does not have insurance.  Patient feels he is dying and needs to figure out what is wrong with him in order to get better.  Patient recently decided he would take matters into his own hands and hung himself in his garage, but after kicking the chair out from underneath him, the rope untied.  Patient then tried to overdose on Tylenol unsuccessfully.  Patient has a history of smoking marijuana daily, however he reports he has not smoked for a month.  Patient does not want admission.  Patient would not give names and numbers of his family for collateral    Past Medical History    I have reviewed this patient's medical history and updated it with pertinent information if needed.   Past Medical History:   Diagnosis Date     Anxiety        Past Surgical History   I have reviewed this patient's surgical history and updated it with pertinent information if needed.  No past surgical history on file.    Prior to Admission Medications   Prior to Admission Medications   Prescriptions Last Dose Informant Patient Reported? Taking?   hydrOXYzine (VISTARIL) 25 MG capsule Past Month at Unknown time  Yes Yes   Sig: Take 25 mg by mouth every 6 hours as needed   triamcinolone (KENALOG) 0.025 % cream Past Month at Unknown time  Yes Yes   Sig: Apply topically 2 times daily to rash on face.      Facility-Administered Medications: None     Allergies   No Known Allergies    Social History   I have reviewed this patient's social history and updated it with pertinent information if needed. Robert R Budinger  reports that he has been smoking. He has been smoking about 1.00 pack per day. He has never used smokeless tobacco. He reports previous alcohol use. He reports previous drug use.    Family History   I have reviewed this patient's family history and updated it with pertinent information if needed.   No family history on file.    Review of  "Systems   CONSTITUTIONAL:  negative  EYES:  negative  HEENT:  negative  RESPIRATORY:  negative  CARDIOVASCULAR:  negative  GASTROINTESTINAL:  negative  GENITOURINARY:  negative  INTEGUMENT/BREAST:  \"red\" hands and \"rash\" on face   HEMATOLOGIC/LYMPHATIC:  \"Swollen lymph nodes all over\"  ALLERGIC/IMMUNOLOGIC:  negative  ENDOCRINE:  negative  MUSCULOSKELETAL:  negative  NEUROLOGICAL:  negative    Physical Exam   Temp: 96.4  F (35.8  C) Temp src: Tympanic BP: 143/90 Pulse: 85   Resp: 18 SpO2: 97 % O2 Device: None (Room air)    Vital Signs with Ranges  Temp:  [96.4  F (35.8  C)-98.1  F (36.7  C)] 96.4  F (35.8  C)  Pulse:  [74-85] 85  Heart Rate:  [72] 72  Resp:  [16-18] 18  BP: (133-159)/() 143/90  SpO2:  [97 %-100 %] 97 %  149 lbs 8 oz    Constitutional: NAD, appears well, appears well-nourished   HEENT: atraumatic, normocephalic, PERRLA, Bilateral ears normal, nose normal, oropharynx normal, no thyromegaly, no lymphadenopathy  Respiratory: CTA bilaterally, no rhonchi, no rales, no wheezing, no crackles, symmetric rise and fall of chest, good effort   Cardiovascular: RRR, S1, S2, no extra heart sounds, no murmurs, no edema   GI: normal inspection, normoactive bowel sounds x 4, no tenderness, no guarding, no masses, no organomegaly   Lymph/Hematologic: no lymphadenopathy   Genitourinary: deferred   Skin: warm, dry, intact, no rashes, no open wounds, no cyanosis   Musculoskeletal: gait normal, FROM   Neurologic: alert and oriented   Psychiatric: anxious, cooperative     Data   Data reviewed today:   Recent Labs   Lab 05/27/20  1135   WBC 13.1*   HGB 16.5   MCV 92         POTASSIUM 3.7   CHLORIDE 108   CO2 24   BUN 15   CR 0.75   ANIONGAP 7   KOKO 9.3   *   ALBUMIN 4.2   PROTTOTAL 7.4   BILITOTAL 0.4   ALKPHOS 66   ALT 23   AST 12       No results found for this or any previous visit (from the past 24 hour(s)).    "

## 2020-05-28 NOTE — PLAN OF CARE
2300 Face to face end of shift report received from TALIA Raza.      2327 Pt requested and received atarax 50 mg for sleep at 2327.    0021 Pt in bed asleep at this time.     Pt appears to be sleeping comfortable, no complaints this shift.   Pt slept approximately 6.5 hours.          0600: UA reported 146/100 for Blood pressure reading to this writer. This writer rechecked BP manually and read 143/90. Pt is asymptotic and denies chest pain, and diaphoresis at this time. Pt reports his BP reading's have been higher lately r/t anxiety.  Will notify day shift nurse.     Face to face end of shift report communicated to oncdina MELGAR.       Serena Ball RN  5/28/2020  12:19 AM      Problem: Adult Behavioral Health Plan of Care  Goal: Patient-Specific Goal (Individualization)  Description: Pt will sleep 6-8 hours each night.   Pt will participate in at least 50% of groups.  Pt will eat at least 50% of meals.  Pt will remain compliant with assessments.  Outcome: No Change      Problem: Suicide Risk  Goal: Absence of Self-Harm  Description: Pt will remain free of self harm.  Outcome: Improving

## 2020-05-28 NOTE — PROGRESS NOTES
05/27/20 2117   Patient Belongings   Did you bring any home meds/supplements to the hospital?  No   Patient Belongings sent to security per site process;other (see comments)  (put in assigned cubby in pt. belongings room)   Patient Belongings Put in Hospital Secure Location (Security or Locker, etc.) cell phone/electronics;keys   Belongings Search Yes   Clothing Search Yes   Second Staff None   Comment grey tshirt, pair of socks, green shorts, blue jeans, green hat, black sweatshirt, cigarettes, lighter, shoes   List items sent to safe: passport, birth certificate, social security card, 3 keys, [employer] cell phone w/black case    All other belongings put in assigned cubby in belongings room.     I have reviewed my belongings list on admission and verify that it is correct.     Patient signature_______________________________    Second staff witness (if patient unable to sign) ______________________________       I have received all my belongings at discharge.    Patient signature________________________________    Maritza HOLM  5/27/2020  9:19 PM

## 2020-05-29 PROCEDURE — 25000132 ZZH RX MED GY IP 250 OP 250 PS 637: Performed by: NURSE PRACTITIONER

## 2020-05-29 PROCEDURE — 12400000 ZZH R&B MH

## 2020-05-29 PROCEDURE — 99232 SBSQ HOSP IP/OBS MODERATE 35: CPT | Mod: GT | Performed by: NURSE PRACTITIONER

## 2020-05-29 RX ADMIN — PROPRANOLOL HYDROCHLORIDE 10 MG: 10 TABLET ORAL at 06:26

## 2020-05-29 RX ADMIN — ACETAMINOPHEN 650 MG: 325 TABLET, FILM COATED ORAL at 10:05

## 2020-05-29 RX ADMIN — HYDROXYZINE HYDROCHLORIDE 50 MG: 25 TABLET ORAL at 14:23

## 2020-05-29 RX ADMIN — ACETAMINOPHEN 650 MG: 325 TABLET, FILM COATED ORAL at 17:36

## 2020-05-29 RX ADMIN — QUETIAPINE FUMARATE 50 MG: 25 TABLET ORAL at 21:52

## 2020-05-29 ASSESSMENT — ACTIVITIES OF DAILY LIVING (ADL)
ORAL_HYGIENE: INDEPENDENT
HYGIENE/GROOMING: INDEPENDENT
DRESS: INDEPENDENT;SCRUBS (BEHAVIORAL HEALTH)
LAUNDRY: UNABLE TO COMPLETE

## 2020-05-29 NOTE — PLAN OF CARE
Face to face shift report received from Seerna CABRAL RN. Patient observed.      Problem: Adult Behavioral Health Plan of Care  Goal: Patient-Specific Goal (Individualization)  Description: Pt will sleep 6-8 hours each night.   Pt will participate in at least 50% of groups.  Pt will eat at least 50% of meals.  Pt will remain compliant with assessments.  5/29/2020 0918 by Lauren Chavarria RN  Outcome: No Change  Note: Patient is up in the lounge for breakfast this morning. Pt remains in the lounge throughout the shift, he continues to ruminate about his illnesses. He continues to minimize the SA and talk about no one addressing his physical illness. He does admit to fixation on illness. Pt denies SI, HI, and hallucinations. He denies Mental illness and does not believe he belongs here  1423 Hydroxyzine is administered for anxiety. He does report this is helpful.        Problem: Suicide Risk  Goal: Absence of Self-Harm  Description: Pt will remain free of self harm.  5/29/2020 0132 by Serena Ball RN  Outcome: Improving     The face to face report will be communicated to on coming RN.

## 2020-05-29 NOTE — PROGRESS NOTES
"[facility]  Psychiatric Progress Note      Impression:   This is a 34 year old yo male with a history of anxiety. He was brought to the ED by family after ingesting roughly 40 tablets of Advil.    Robert is seen via video conferencing in the presence of the RN. He denies any suicidal thoughts today. Minimizes the events that lead to his admission, instead is focused on why he is not on a medical floor having tests run. Attempted to discuss that he has been seen in ED more than once for his concerns, saw his PCP, and also met with Dermatology via telemedicine all within the last 6 weeks. All tests run have been unremarkable, vitals are WNL. He then holds up his hands and states \"look at this. This redness is burning\". Reviewed whether he has utilized any new lotions or soaps, which he denies. Also discussed that increased hand washing due to COVID pandemic can also lead to irritation of the hands. He dismisses all of this. He then lifts up his shirt to show me a \"bruise on my side\". However I am unable to see this and his nurse, who is in the room with him, is unable to observe any bruising. When told this he states \"well it feels like it is bruised. My armpit hurts.\" We discussed trying an as needed medication for anxiety, such as the Seroquel, which may help with his ruminative thoughts and anxiety. However he remains hesitant to consider medications as he feels that once something medically can be diagnosed that his anxiety will go away. He does get increasingly irritable throughout our meeting, states he would rather just go home \"to get the help I need from my family\". When asked what his family would do to help him he states \"I don't know. I'm done here\".       Educated regarding medication indications, risks, benefits, side effects, contraindications and possible interactions. Verbally expressed understanding.        Diagnoses:   Adjustment disorder with mixed anxiety and depressed mood  Unspecified " "anxiety disorder, R/o ZACHARIAH  R/o illness anxiety disorder  R/o unspecified psychosis      Attestation:  Patient has been seen and evaluated by me,  Yudith Verma, NP          Interim History:   The patient's care was discussed with the treatment team and chart notes were reviewed.    BEHAVIORAL TEAM DISCUSSION    Progress: limited. Remains highly anxious and fixated on perceived physical concerns    Continued Stay Criteria/Rationale: recent overdose attempt, work on managing anxiety, ensure that safe discharge plan is in place.    Medical/Physical: reports \"bruising\" that is not seen by myself or his nurse  Precautions:   Falls precaution?: No  Behavioral Orders   Procedures     Code 1 - Restrict to Unit     Routine Programming     As clinically indicated     Status 15     Every 15 minutes.     Plan:   Continue PRN medication for anxiety  Declines any scheduled medication currently  Encourage group participation    Rationale for change in precautions or plan: continues to feel anxious and hyperfocused on \"rash\" and \"bruises\" that are not seen by his nurse, medical NP, or myself.      Participants: Yudith Verma CNP, nursing,           Medications:     Current Facility-Administered Medications   Medication     acetaminophen (TYLENOL) tablet 650 mg     alum & mag hydroxide-simethicone (MAALOX  ES) suspension 30 mL     hydrOXYzine (ATARAX) tablet 25-50 mg     magnesium hydroxide (MILK OF MAGNESIA) suspension 30 mL     mineral oil-hydrophilic petrolatum (AQUAPHOR)     nicotine (NICORETTE) gum 2-4 mg     OLANZapine (zyPREXA) tablet 5-10 mg    Or     OLANZapine (zyPREXA) injection 5-10 mg     propranolol (INDERAL) tablet 10 mg     QUEtiapine (SEROquel) tablet 25-50 mg          10 point ROS- reports \"burning rash\" on hands and \"bruising\"- not seen during this visit       Allergies:   No Known Allergies         Psychiatric Examination:   /98   Pulse 68   Temp 98.5  F (36.9  C) (Tympanic)   Resp 16   Ht 1.727 m " "(5' 8\")   Wt 67.8 kg (149 lb 8 oz)   SpO2 97%   BMI 22.73 kg/m    Weight is 149 lbs 8 oz  Body mass index is 22.73 kg/m .    Appearance:  awake, alert, adequately groomed, dressed in hospital scrubs and appeared as age stated  Attitude:  cooperative, minimizing  Eye Contact:  good  Mood:  anxious  Affect:  mood congruent  Speech:  clear, coherent, somewhat rambling when more anxious  Psychomotor Behavior:  no evidence of tardive dyskinesia, dystonia, or tics  Thought Process:  linear and goal oriented, irrational and perseverative  Associations:  no loose associations  Thought Content:  no evidence of suicidal ideation or homicidal ideation, denies hallucinations  Insight:  limited  Judgment:  limited  Oriented to:  time, person, and place  Attention Span and Concentration:  fair  Recent and Remote Memory:  intact  Fund of Knowledge: appropriate  Muscle Strength and Tone: normal  Gait and Station: Normal           Labs:   No results found for this or any previous visit (from the past 24 hour(s)).           Video Visit:   John R Budinger is a 34 year old male who is being evaluated via a billable video visit.      The patient has been notified of following:     \"This video visit will be conducted via a call between you and your physician/provider. We have found that certain health care needs can be provided without the need for an in-person physical exam.  This service lets us provide the care you need with a video conversation.  If a prescription is necessary we can send it directly to your pharmacy.  If lab work is needed we can place an order for that and you can then stop by our lab to have the test done at a later time.    If during the course of the call the physician/provider feels a video visit is not appropriate, you will not be charged for this service.\"     Patient has given verbal consent for Video visit? Yes    Video Start Time: 1010    I have reviewed and updated the patient's Past Medical History, " Social History, Family History and Medication List.      Video-Visit Details    Type of service:  Video Visit    Video End Time (time video stopped): 1024    Originating Location (pt. Location): Other  [facility]    Distant Location (provider location):  [facility] remote provider    Mode of Communication:  Video Conference via [employer] Meeting liz

## 2020-05-29 NOTE — PLAN OF CARE
2300: Face to face end of shift report received from Mannie ROJO RN.     0100: Pt seen in bed asleep lying on L side.       0600: UA checked vitals. BP- 128/99. This writer accessed pt found him anxious wanting to go home. Pt agreed to try the propranolol.    0626: Pt received 10 mg of propranolol at this time. Co nurse also encouraged pt to attend groups today, as they may help pt feel less anxious and pass time. Pt agreed, and thanked us for the help. Upon self and co nurse leaving room pt stated was going brush teeth etc. and then come out to lounge area at this time for coffee.      Pt slept approx. 3.5 hours. Awake on and off in bed. No complaints this shift.     0730: Face to face end of shift report communicated to oncdina RN.      Serena Ball RN  5/29/2020  1:38 AM                Problem: Adult Behavioral Health Plan of Care  Goal: Patient-Specific Goal (Individualization)  Description: Pt will sleep 6-8 hours each night.   Pt will participate in at least 50% of groups.  Pt will eat at least 50% of meals.  Pt will remain compliant with assessments.  5/29/2020 0132 by Serena Ball RN  Outcome: Improving  Note:        Problem: Suicide Risk  Goal: Absence of Self-Harm  Description: Pt will remain free of self harm.  5/29/2020 0132 by Serena Ball RN  Outcome: Improving  5/28/2020 1548 by Mannie Melo RN  Outcome: Improving

## 2020-05-29 NOTE — PLAN OF CARE
1824 Pt c/o anxiety and requests mediation. nurse administers Seroquel 50 mg as due to anxiety and continued thoughts of illness with fear of pending death secondary to illness. Pt appears to respond well to medication. Body language is less anxious.

## 2020-05-30 PROCEDURE — 25000132 ZZH RX MED GY IP 250 OP 250 PS 637: Performed by: NURSE PRACTITIONER

## 2020-05-30 PROCEDURE — 12400000 ZZH R&B MH

## 2020-05-30 RX ADMIN — ACETAMINOPHEN 650 MG: 325 TABLET, FILM COATED ORAL at 11:40

## 2020-05-30 RX ADMIN — HYDROXYZINE HYDROCHLORIDE 50 MG: 25 TABLET ORAL at 11:40

## 2020-05-30 RX ADMIN — WHITE PETROLATUM: 1.75 OINTMENT TOPICAL at 11:40

## 2020-05-30 RX ADMIN — QUETIAPINE FUMARATE 50 MG: 25 TABLET ORAL at 19:10

## 2020-05-30 RX ADMIN — PROPRANOLOL HYDROCHLORIDE 10 MG: 10 TABLET ORAL at 06:54

## 2020-05-30 RX ADMIN — NICOTINE POLACRILEX 4 MG: 2 GUM, CHEWING ORAL at 19:14

## 2020-05-30 ASSESSMENT — ACTIVITIES OF DAILY LIVING (ADL)
HYGIENE/GROOMING: INDEPENDENT
HYGIENE/GROOMING: INDEPENDENT
LAUNDRY: UNABLE TO COMPLETE
ORAL_HYGIENE: INDEPENDENT
ORAL_HYGIENE: INDEPENDENT
DRESS: SCRUBS (BEHAVIORAL HEALTH);INDEPENDENT
DRESS: INDEPENDENT;SCRUBS (BEHAVIORAL HEALTH)
LAUNDRY: UNABLE TO COMPLETE

## 2020-05-30 ASSESSMENT — MIFFLIN-ST. JEOR: SCORE: 1605.77

## 2020-05-30 NOTE — PLAN OF CARE
Problem: Adult Behavioral Health Plan of Care  Goal: Patient-Specific Goal (Individualization)  Description: Pt will sleep 6-8 hours each night.   Pt will participate in at least 50% of groups.  Pt will eat at least 50% of meals.  Pt will remain compliant with assessments.    Pt appears to be sleeping comfortably with regular respirations. Pt slept approx. 5 hours this shift. No complaints at this time. Will continue to monitor.    0645 /103. Rechecked manually, /100. Pt asymptomatic other than reports of anxiety. Prn propranolol administered at 0654 and oncall notified of high BP.     Outcome: No Change    Problem: Suicide Risk  Goal: Absence of Self-Harm  Description: Pt will remain free of self harm.    Outcome: No Change     Face to face end of shift report communicated to oncoming RN.     5/30/2020  5:54 AM

## 2020-05-30 NOTE — PLAN OF CARE
"  Problem: Adult Behavioral Health Plan of Care  Goal: Patient-Specific Goal (Individualization)  Description: Pt will sleep 6-8 hours each night.   Pt will participate in at least 50% of groups.  Pt will eat at least 50% of meals.  Pt will remain compliant with assessments.  5/29/2020 2155 by Serena Lagos, RN  Note: Patient up on unit watching television and attending groups throughout shift. Full range affect, clear speech and friendly during conversation.   1736- Received PRN Tylenol 650 mg for reports of left \"armpit\" pain.   Patient denies all assessment criteria. Does report frustration about having to stay here over the weekend but remains appropriate. Reports concern about protests in [place], watching news and making calls to family through the evening.   2152- Received PRN Seroquel 50 mg for reports of anxiety rated 7/10.   Patient laying in bed to rest shortly after for remainder of shift. Continue to monitor at this time.            Problem: Suicide Risk  Goal: Absence of Self-Harm  Description: Pt will remain free of self harm.  Note: Continue to monitor at this time.     Face to face end of shift report communicated to oncoming RN.     Serena Lagos, RN  5/29/2020  10:13 PM        "

## 2020-05-30 NOTE — PLAN OF CARE
Face to face report received from Marleni MELGAR. Pt. Observed.     Problem: Adult Behavioral Health Plan of Care  Goal: Patient-Specific Goal (Individualization)  Description: Pt will sleep 6-8 hours each night.   Pt will participate in at least 50% of groups.  Pt will eat at least 50% of meals.  Pt will remain compliant with assessments.  5/30/2020 0905 by Sara Sanon RN  Outcome: No Change  Note:   Pt. Denies HI, SI, depression, and hallucinations.Pt. reporting he was increasingly anxious this a.m. and PRN Propanol he was administered has helped. Pt. Reporting to mild left armpit pain. Pt. Denies and pharmacological or non pharmacological intervention at this time. Pt. Is cooperative with nursing assessment. Pt. Is in agreement to update staff to thoughts feelings of wanting to harm self or others. Pt. Eating WDL. Pt. Denies issues with bowel and bladder.     1150 Pt. Administered PRN Tylenol 650 mg po for left armpit pain with effective results.   1150 Pt. Administered PRN Atarax 50 mg po for increasing anxiety with effective results.  1150 Pt. Administered PRN Aquaphor for left face dryness.        Face to face end of shift report to be communicated to oncoming RN.     Sara Sanon RN  5/30/2020

## 2020-05-30 NOTE — PLAN OF CARE
"  Problem: Adult Behavioral Health Plan of Care  Goal: Patient-Specific Goal (Individualization)  Description: Pt will sleep 6-8 hours each night.   Pt will participate in at least 50% of groups.  Pt will eat at least 50% of meals.  Pt will remain compliant with assessments.  5/30/2020 1814 by Inge Yip, RN  Outcome: No Change  Note: 1550: Received end of shift report from TALIA Ruiz.   1645: Pt arrived to  escorted by security et [facility] staff, cooperative, anxious--  1818: Pt voices feelings of fear et anxiety due to transfer, this writer et staff provided reassurance. Sister updated on condition, med management, inquiring about discharge et transport.    1905: Pt continues to fixate on unit transfer, voices fear of being transferred to a \"long term behavioral unit; multiple staff provided patient reassurance.    1915: Pt in agreement to take prn seroquel 50 mg, voices feeling of fear et high anxiety,    2045: Effective prn relief noted, improved mood et affect, reminiscing with roommate, in good spirits.     2257: Pt continues to visit with roommate, reports decreased feelings of anxiousness. Requested ear plugs.     Face to face end of shift report to be  communicated to on-coming staff.     Inge Yip RN  5/30/2020  10:58 PM                  Problem: Adult Behavioral Health Plan of Care  Goal: Adheres to Safety Considerations for Self and Others  Outcome: Improving  Note: Appropriate behavior with staff et peers.      Problem: Suicide Risk  Goal: Absence of Self-Harm  Description: Pt will remain free of self harm.  Outcome: Improving  Note: Pt denies SI/HI, remains free from injury et self harm.      "

## 2020-05-31 PROCEDURE — 12400000 ZZH R&B MH

## 2020-05-31 PROCEDURE — 25000132 ZZH RX MED GY IP 250 OP 250 PS 637: Performed by: NURSE PRACTITIONER

## 2020-05-31 PROCEDURE — 99231 SBSQ HOSP IP/OBS SF/LOW 25: CPT | Mod: GT | Performed by: NURSE PRACTITIONER

## 2020-05-31 RX ADMIN — WHITE PETROLATUM: 1.75 OINTMENT TOPICAL at 20:11

## 2020-05-31 RX ADMIN — QUETIAPINE FUMARATE 50 MG: 25 TABLET ORAL at 19:26

## 2020-05-31 RX ADMIN — ACETAMINOPHEN 650 MG: 325 TABLET, FILM COATED ORAL at 09:27

## 2020-05-31 RX ADMIN — HYDROXYZINE HYDROCHLORIDE 50 MG: 25 TABLET ORAL at 09:27

## 2020-05-31 RX ADMIN — NICOTINE POLACRILEX 4 MG: 2 GUM, CHEWING ORAL at 19:26

## 2020-05-31 ASSESSMENT — MIFFLIN-ST. JEOR: SCORE: 1606.68

## 2020-05-31 ASSESSMENT — ACTIVITIES OF DAILY LIVING (ADL)
ORAL_HYGIENE: INDEPENDENT
DRESS: SCRUBS (BEHAVIORAL HEALTH)
LAUNDRY: UNABLE TO COMPLETE
ORAL_HYGIENE: INDEPENDENT
LAUNDRY: UNABLE TO COMPLETE
HYGIENE/GROOMING: INDEPENDENT
DRESS: SCRUBS (BEHAVIORAL HEALTH);INDEPENDENT
HYGIENE/GROOMING: INDEPENDENT

## 2020-05-31 NOTE — PLAN OF CARE
Observed pt lying on his right side - eyes closed - non -labored breathing slept all noc without issue - it is now 0650 and [facility] with peers.Face to face end of shift report communicated to oncoming RN     Fe Flowers RN  5/31/2020  6:52 AM

## 2020-05-31 NOTE — PLAN OF CARE
Problem: Adult Behavioral Health Plan of Care  Goal: Patient-Specific Goal (Individualization)  Description: Pt will sleep 6-8 hours each night.   Pt will participate in at least 50% of groups.  Pt will eat at least 50% of meals.  Pt will remain compliant with assessments.  Outcome: No Change  Note:   Patient denies SI, HI, clemente., pain, depression and anxiety. He contracts for safety. He is withdrawn but friendly.   0930: Patient up at nurse's station requesting to have something for anxiety and pain. He rates pain in shoulder/arm at 7/10. He was given PRN atarax and tylenol for these things.   1300: Patient states that he is doing much better this afternoon and that medications have been effective.     Problem: Suicide Risk  Goal: Absence of Self-Harm  Description: Pt will remain free of self harm.  Outcome: No Change   Patient denies any SI at this time. He contracts for safety.

## 2020-05-31 NOTE — PROGRESS NOTES
[facility]  Psychiatric Progress Note      Impression:   This is a 34 year old yo male with a history of anxiety. He was brought to the ED by family after ingesting roughly 40 tablets of Advil.    Robert is seen via video conferencing in the presence of the RN. He appears much less anxious today. He is not hyper-focused on his medical concerns today, I do not observe a rash to his face and he does not bring this up at all today. Does state that he has been attending some of the groups and has been socializing with others. He denies any suicidal ideation. He does state that Seroquel as needed has been helpful, states he feel much less anxious after taking it and states that the 50 mg dose helps him to sleep through the night. He did sleep well last night. His hold will  tomorrow and is is looking forward to discharging home to family. Will need to see what transportation options are available to get him back to [place], he plans on calling a few family and friends to try to arrange a ride.       Educated regarding medication indications, risks, benefits, side effects, contraindications and possible interactions. Verbally expressed understanding.        Diagnoses:   Adjustment disorder with mixed anxiety and depressed mood  Unspecified anxiety disorder, R/o ZACHARIAH  R/o illness anxiety disorder  R/o unspecified psychosis      Attestation:  Patient has been seen and evaluated by me,  Yudith Verma NP          Interim History:   The patient's care was discussed with the treatment team and chart notes were reviewed.    BEHAVIORAL TEAM DISCUSSION    Progress: Fair. Anxiety improving, denies SI    Continued Stay Criteria/Rationale: recent overdose attempt, work on managing anxiety, ensure that safe discharge plan is in place.    Medical/Physical: does not verbalize any medical concerns to me today  Precautions:   Falls precaution?: No  Behavioral Orders   Procedures     Code 1 - Restrict to Unit      "Routine Programming     As clinically indicated     Status 15     Every 15 minutes.     Plan:   Continue PRN medication for anxiety  Declines any scheduled medication currently  Will likely discharge home tomorrow. Will need to look into what transportation options are available as he does need to get back to [place] where his family lives.    Rationale for change in precautions or plan: still anxious but improved. Slept well last night.      Participants: Yudith Verma CNP, nursing          Medications:     Current Facility-Administered Medications   Medication     acetaminophen (TYLENOL) tablet 650 mg     alum & mag hydroxide-simethicone (MAALOX  ES) suspension 30 mL     hydrOXYzine (ATARAX) tablet 25-50 mg     magnesium hydroxide (MILK OF MAGNESIA) suspension 30 mL     mineral oil-hydrophilic petrolatum (AQUAPHOR)     nicotine (NICORETTE) gum 2-4 mg     OLANZapine (zyPREXA) tablet 5-10 mg    Or     OLANZapine (zyPREXA) injection 5-10 mg     propranolol (INDERAL) tablet 10 mg     QUEtiapine (SEROquel) tablet 25-50 mg          10 point ROS- denies any issues today       Allergies:   No Known Allergies         Psychiatric Examination:   BP (!) 159/105   Pulse 79   Temp 98.7  F (37.1  C) (Tympanic)   Resp 16   Ht 1.727 m (5' 8\")   Wt 69.7 kg (153 lb 11.2 oz)   SpO2 97%   BMI 23.37 kg/m    Weight is 153 lbs 11.2 oz  Body mass index is 23.37 kg/m .    Appearance:  awake, alert, adequately groomed, dressed in hospital scrubs and appeared as age stated  Attitude:  cooperative pleasant  Eye Contact:  good  Mood: \"better\", appears more relaxed today, anxiety is improving  Affect:  Full range  Speech:  Clear, coherent  Psychomotor Behavior:  no evidence of tardive dyskinesia, dystonia, or tics  Thought Process:  linear and goal oriented , is no longer perseverating on perceived medical concerns  Associations:  no loose associations  Thought Content:  no evidence of suicidal ideation or homicidal ideation, denies " "hallucinations  Insight:  limited, slight improvement  Judgment:  limited  Oriented to:  time, person, and place  Attention Span and Concentration:  fair  Recent and Remote Memory:  intact  Fund of Knowledge: appropriate fr education  Muscle Strength and Tone: normal  Gait and Station: Normal           Labs:   No results found for this or any previous visit (from the past 24 hour(s)).           Video Visit:   John R Budinger is a 34 year old male who is being evaluated via a billable video visit.      The patient has been notified of following:     \"This video visit will be conducted via a call between you and your physician/provider. We have found that certain health care needs can be provided without the need for an in-person physical exam.  This service lets us provide the care you need with a video conversation.  If a prescription is necessary we can send it directly to your pharmacy.  If lab work is needed we can place an order for that and you can then stop by our lab to have the test done at a later time.    If during the course of the call the physician/provider feels a video visit is not appropriate, you will not be charged for this service.\"     Patient has given verbal consent for Video visit? Yes    Video Start Time: 0950    I have reviewed and updated the patient's Past Medical History, Social History, Family History and Medication List.      Video-Visit Details    Type of service:  Video Visit    Video End Time (time video stopped): 1006    Originating Location (pt. Location): Other  [facility]    Distant Location (provider location):  [facility] remote provider    Mode of Communication:  Video Conference via [employer] Meeting liz                  "

## 2020-06-01 VITALS
SYSTOLIC BLOOD PRESSURE: 146 MMHG | OXYGEN SATURATION: 95 % | RESPIRATION RATE: 16 BRPM | HEART RATE: 75 BPM | TEMPERATURE: 98.9 F | DIASTOLIC BLOOD PRESSURE: 100 MMHG | BODY MASS INDEX: 23.3 KG/M2 | HEIGHT: 68 IN | WEIGHT: 153.7 LBS

## 2020-06-01 PROCEDURE — 99239 HOSP IP/OBS DSCHRG MGMT >30: CPT | Mod: GT | Performed by: NURSE PRACTITIONER

## 2020-06-01 PROCEDURE — 25000132 ZZH RX MED GY IP 250 OP 250 PS 637: Performed by: NURSE PRACTITIONER

## 2020-06-01 RX ORDER — MINERAL OIL/HYDROPHIL PETROLAT
OINTMENT (GRAM) TOPICAL 2 TIMES DAILY PRN
COMMUNITY
Start: 2020-06-01

## 2020-06-01 RX ORDER — QUETIAPINE FUMARATE 25 MG/1
25-50 TABLET, FILM COATED ORAL 2 TIMES DAILY PRN
Qty: 60 TABLET | Refills: 0 | Status: SHIPPED | OUTPATIENT
Start: 2020-06-01

## 2020-06-01 RX ADMIN — ACETAMINOPHEN 650 MG: 325 TABLET, FILM COATED ORAL at 09:16

## 2020-06-01 RX ADMIN — PROPRANOLOL HYDROCHLORIDE 10 MG: 10 TABLET ORAL at 09:17

## 2020-06-01 ASSESSMENT — ACTIVITIES OF DAILY LIVING (ADL)
DRESS: INDEPENDENT;SCRUBS (BEHAVIORAL HEALTH)
HYGIENE/GROOMING: INDEPENDENT
ORAL_HYGIENE: INDEPENDENT

## 2020-06-01 NOTE — PLAN OF CARE
At 0015 observed pt lying on his left side - eyes closed - non-labored breathing noted. May possibly discharge today and return home - transportation at this time an issue - in the chart pt may try to contact family/friends in an effort to get ride home - otherwise staff here will try to obtain.  Face to face end of shift report communicated to oncoming TALIA.     Fe Flowers RN  6/1/2020  6:56 AM

## 2020-06-01 NOTE — PLAN OF CARE
Spoke with NP this morning and pt will be discharging. Resources will be provided as pt does not currently have insurance. Spoke with pt's sister, Katiuska. She states she will speak with her dad to see if he can come and pick pt up and will call this writer back. Writer checked [employer] lines and it does not appear busses are running to [place] today.

## 2020-06-01 NOTE — DISCHARGE INSTRUCTIONS
Behavioral Discharge Planning and Instructions    Summary: Robert was admitted to  with increased mental health symptoms     Main Diagnosis:  Adjustment disorder with mixed anxiety and depressed mood, Unspecified anxiety disorder, R/o, ZACHARIAH, R/o illness anxiety disorder, R/o unspecified psychosis    Major Treatments, Procedures and Findings: Stabilize with medications, connect with community programs.    Symptoms to Report: feeling more aggressive, increased confusion, losing more sleep, mood getting worse or thoughts of suicide    Lifestyle Adjustment: Take all medications as prescribed, meet with doctor/ medication provider, out patient therapist, , and [facility] worker as scheduled. Abstain from alcohol or any unprescribed drugs.    Psychiatry Follow-up:     *No appointments have been arranged for patient as patient does not currently have active insurance and patient's sister is working on getting patient set up with follow up arrangements.           Resources:   Crisis Intervention: 606.451.3099 or 552-506-3897 (TTY: 459.350.3629).  Call anytime for help.  National [place] on Mental Illness (www.[place].krystin.org): 922.199.6046 or 269-288-0549.  Alcoholics Anonymous (www.alcoholics-anonymous.org): Check your phone book for your local chapter.  Suicide Awareness Voices of Education (SAVE) (www.save.org): 725-940-XZAO (4751)  National Suicide Prevention Line (www.mentalhealthmn.org): 755-170-ETXJ (5121)  Mental Health Consumer/Survivor Network of [place] (www.mhcsn.net): 518.244.2620 or 088-746-8704  Mental Health Association of [place] (www.mentalhealth.org): 984.985.7511 or 374-176-2783    General Medication Instructions:   See your medication sheet(s) for instructions.   Take all medicines as directed.  Make no changes unless your doctor suggests them.   Go to all your doctor visits.  Be sure to have all your required lab tests. This way, your medicines can be refilled on time.  Do not use any drugs not prescribed by  your doctor.  Avoid alcohol.    Your symptoms show that you may have coronavirus (COVID-19). This illness can cause fever, cough and trouble breathing. Many people get a mild case and get better on their own. Some people can get very sick.     Not all patients are tested for COVID-19. If you need to be tested, your care team will let you know.     How can I protect others?    Without a test, we can't know for sure that you have COVID-19. For safety, it's very important to follow these rules.    Stay home and away from others (self-isolate) until:    At least 10 days have passed since your symptoms started. And     You've had no fever--and no medicine that reduces fever--for 3 full days (72 hours). And      Your other symptoms have resolved (gotten better).     During this time:    Stay in your own room (and use your own bathroom), if you can.    Stay away from others in your home. No hugging, kissing or shaking hands.    No visitors.    Don't go to work, school or anywhere else.     Clean  high touch  surfaces often (doorknobs, counters, handles, etc.). Use a household cleaning spray or wipes.    Cover your mouth and nose with a mask, tissue or wash cloth to avoid spreading germs.    Wash your hands and face often. Use soap and water.    For more tips, go to https://www.cdc.gov/coronavirus/2019-ncov/downloads/10Things.pdf.    How can I take care of myself?    1. Get lots of rest. Drink extra fluids (unless a doctor has told you not to).     2. Take Tylenol (acetaminophen) for fever or pain. If you have liver or kidney problems, ask your family doctor if it's okay to take Tylenol.     Adults can take either:     650 mg (two 325 mg pills) every 4 to 6 hours, or     1,000 mg (two 500 mg pills) every 8 hours as needed.     Note: Don't take more than 3,000 mg in one day.   Acetaminophen is found in many medicines (both prescribed and over-the-counter medicines). Read all labels to be sure you don't take too much.   For  children, check the Tylenol bottle for the right dose. The dose is based on the child's age or weight.    3. If you have other health problems (like cancer, heart failure, an organ transplant or severe kidney disease): Call your specialty clinic if you don't feel better in the next 2 days.    4. Know when to call 911: If your breathing is so bad that it keeps you from doing normal activities, call 911 or go to the emergency room. Tell them that you've been staying home and may have COVID-19.      What are the symptoms of COVID-19?     The most common symptoms are cough, fever and trouble breathing.     Less common symptoms include body aches, chills, diarrhea (loose, watery poops), fatigue (feeling very tired), headache, runny nose, sore throat and loss of smell.     COVID-19 can cause severe coughing (bronchitis) and lung infection (pneumonia).    How does it spread?     The virus may spread when a person coughs or sneezes into the air. The virus can travel about 6 feet this way, and it can live on surfaces.      Common  (household disinfectants) will kill the virus.    Who is at risk?  Anyone can catch COVID-19 if they're around someone who has the virus.    How can others protect themselves?     Stay away from people who have COVID-19 (or symptoms of COVID-19).    Wash hands often with soap and water. Or, use hand  with at least 60% alcohol.    Avoid touching the eyes, nose or mouth.     Wear a face mask when you go out in public, when sick or when caring for a sick person.      For more about COVID-19 and caring for yourself at home, please visit the CDC website at https://www.cdc.gov/coronavirus/2019-ncov/about/steps-when-sick.html.     To learn about care at [facility], go to [email or web address].    Below are the COVID-19 hotlines at the [place] Department of Health ([facility]). Interpreters are available.     For health questions: Call 743-115-0331 or  1-422.474.6579 (7 a.m. to 7 p.m.)    For questions about schools and childcare: Call 504-090-7117 or 1-655.356.3302 (7 a.m. to 7 p.m.)

## 2020-06-01 NOTE — PLAN OF CARE
"Discharge Note    Patient Discharged to home on 6/1/2020 12:00 PM via [employer] accompanied by .     Patient informed of discharge instructions in AVS. patient verbalizes understanding and denies having any questions pertaining to AVS. Patient stable at time of discharge. Patient denies SI, HI, and thoughts of self harm at time of discharge. All personal belongings returned to patient. Discharge prescriptions sent to [facility] via electronic communication.     Family was unable to  pt.  Pt discharged via [employer].  Pt received his meds from [facility] pharmacy.  Cooperative with staff.  Walked out with [facility] .     CECI SALMERON RN  6/1/2020  12:02 PM        CECI SALMERON RN  6/1/2020  8:36 AM  Face to face shift report received from TALIA Solorio. Rounding completed, pt observed.     0950- Pt reports sleeping well last night.  Pt is excited and anxious about discharge today.  Pt requested and given propranolol.  \"I miss my family.  My sister will help me get insurance.\"  When asked, pt reports feeling hopefull \"I'm starting to see light at the end of the tunnel\".  Depression \" a lot better\".  Reports chronic pain \"all over\" and tylenol was given.  Denies all other criteria.    Plan to discharge today.  Dad will be here around 2pm.  1043- Apparently family can not pick him up.  Pt will be discharged @1130 via [employer].         Problem: Adult Behavioral Health Plan of Care  Goal: Patient-Specific Goal (Individualization)  Description: Pt will sleep 6-8 hours each night.   Pt will participate in at least 50% of groups.  Pt will eat at least 50% of meals.  Pt will remain compliant with assessments.  Outcome: Improving     Problem: Suicide Risk  Goal: Absence of Self-Harm  Description: Pt will remain free of self harm.  Outcome: Improving         "

## 2020-06-01 NOTE — DISCHARGE SUMMARY
"Psychiatric Discharge Summary    John R Budinger MRN# [id number]   Age: 34 year old YOB: 1985     Date of Admission:  5/27/2020  Date of Discharge:  6/1/2020  Admitting Physician:  Robert Nj MD  Discharge Physician:  Yudith Verma CNP         Event Leading to Hospitalization and Hospital Stay   Admission:  John R Budinger is a 34 year old single  male who was brought to the [facility] ED by a family member for evaluation of suicidal ideation. He reported that he had been feeling suicidal since Sunday. He was going to hang himself in his garage but he could not get the rope tied. He then ingested about 40 tablets of Advil. In ED he reported feeling that he was \"rotting from the inside\", reports he sees red marks on his face and hands that started 3 months ago. He was worried that he might have been stuck by a needle at a previous job, though has seen primary care and all tests have been unremarkable. He reports that he feels he is dying. He reported that he decided he would take matters into his own hands. He was not agreeable to admission so was placed on a 72 hour hold and transferred to behavioral health for further evaluation.     Robert reports that his health has been fading for the last 3 months. He states that he has noticed a reddened \"rash\" on his hands and face. He also feels that his lymph nodes are swollen. He states that he has gone to see a medical doctor and they were unable to tell him what was wrong. He was referred to dermatology and had a telemedicine visit on 5/8/20, which indicated \"ill defined hypopigmented patch on right cheek\". He was prescribed Kenalog cream. It was recommended he return in 2 months. He remains fixated on what he perceives as a medical condition that is making his skin \"rot\". 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 PCP did order labs that included HIV, Hep C, Hep B, chlamydia, " "gonorrhea which were negative. He reports feeling that no one is taking him seriously, which is what prompted him to feel suicidal. He does report that he feels ashamed that he is here, states \"I know I went too far, I just felt like I was backed into a corner\". He reports feeling remorseful for his actions, indicating that he does not want to do anything to hurt his family. States that he is very close with family, currently lives with his brother. He denies any suicidal ideation today. He denies a history of depression except over the last 2-3 months. States that he has experienced anxiety before though never to this extent. Reports he sleeps \"decently\", usually an average of 8 hours. Appetite has been good, denies any hallucinations.      He did meet with EBENEZER Funez who indicated that there did not see to be any visible rash. She ordered aquaphor for dry skin. He does not believe that he needs to take a medication daily, though does want to try something for as needed anxiety. He agrees to try Hydroxyzine today. He also is agreeable to see a therapist, so will ensure that he has a follow-up with PCP and with therapist prior to discharge.    Hospital stay:  Robert was admitted to the behavioral health unit on a 72 hour hold. He initially declined medication as he felt that his issues were not related to mental health concerns, so there was not need for this type of medications. By third day of admission he did start to report feeling less anxious. He was willing to utilize as needed Hydroxyzine and Seroquel. He did feel that the Seroquel was the most beneficial as it helped him to feel less tense during the day and helped him with sleep at night. He tolerated taking 25 mg during the day, anything higher made him feel somewhat sedated. He became much less perseverative on perceived medical concerns. He did meet with our medical provider whose exam was unremarkable. Upon admission patient had been referencing " bruising and rashes that were not seen by provider or staff. On day of discharge he does not make reference to any acute medical concerns. He was able to attend group programming and was visible in the unit milieu. He denied any further suicidal thoughts. He did verbalize remorse for his actions prior to admission and was very appreciative of staff during his stay. He is willing to establish with a therapist and with a PCP. Blood pressure was mildly elevated throughout his stay and he is instructed to follow-up with PCP for this once his medical insurance becomes active. He will discharge home, his sister was contacted and plans to help him with follow-up appointments.           At time of discharge, there is no evidence that patient is in immediate danger of self or others.        DIagnoses:   Adjustment disorder with mixed anxiety and depressed mood  Unspecified anxiety disorder, R/o ZACHARIAH  R/o illness anxiety disorder  R/o unspecified psychosis         Labs:   Labs:          Results for orders placed or performed during the hospital encounter of 05/27/20 (from the past 48 hour(s))   CBC with platelets differential   Result Value Ref Range     WBC 13.1 (H) 4.0 - 11.0 10e9/L     RBC Count 5.08 4.4 - 5.9 10e12/L     Hemoglobin 16.5 13.3 - 17.7 g/dL     Hematocrit 46.8 40.0 - 53.0 %     MCV 92 78 - 100 fl     MCH 32.5 26.5 - 33.0 pg     MCHC 35.3 31.5 - 36.5 g/dL     RDW 12.8 10.0 - 15.0 %     Platelet Count 310 150 - 450 10e9/L     Diff Method Automated Method       % Neutrophils 76.5 %     % Lymphocytes 14.1 %     % Monocytes 7.9 %     % Eosinophils 1.2 %     % Basophils 0.1 %     % Immature Granulocytes 0.2 %     Nucleated RBCs 0 0 /100     Absolute Neutrophil 10.0 (H) 1.6 - 8.3 10e9/L     Absolute Lymphocytes 1.9 0.8 - 5.3 10e9/L     Absolute Monocytes 1.0 0.0 - 1.3 10e9/L     Absolute Eosinophils 0.2 0.0 - 0.7 10e9/L     Absolute Basophils 0.0 0.0 - 0.2 10e9/L     Abs Immature Granulocytes 0.0 0 - 0.4 10e9/L      Absolute Nucleated RBC 0.0     Comprehensive metabolic panel   Result Value Ref Range     Sodium 139 133 - 144 mmol/L     Potassium 3.7 3.4 - 5.3 mmol/L     Chloride 108 94 - 109 mmol/L     Carbon Dioxide 24 20 - 32 mmol/L     Anion Gap 7 3 - 14 mmol/L     Glucose 114 (H) 70 - 99 mg/dL     Urea Nitrogen 15 7 - 30 mg/dL     Creatinine 0.75 0.66 - 1.25 mg/dL     GFR Estimate >90 >60 mL/min/[1.73_m2]     GFR Estimate If Black >90 >60 mL/min/[1.73_m2]     Calcium 9.3 8.5 - 10.1 mg/dL     Bilirubin Total 0.4 0.2 - 1.3 mg/dL     Albumin 4.2 3.4 - 5.0 g/dL     Protein Total 7.4 6.8 - 8.8 g/dL     Alkaline Phosphatase 66 40 - 150 U/L     ALT 23 0 - 70 U/L     AST 12 0 - 45 U/L   Acetaminophen level   Result Value Ref Range     Acetaminophen Level <2 mg/L   Salicylate level   Result Value Ref Range     Salicylate Level 4 mg/dL   Alcohol ethyl   Result Value Ref Range     Ethanol g/dL <0.01 <0.01 g/dL   Drug abuse screen 6 urine (chem dep)   Result Value Ref Range     Amphetamine Qual Urine Negative NEG^Negative     Barbiturates Qual Urine Negative NEG^Negative     Benzodiazepine Qual Urine Negative NEG^Negative     Cannabinoids Qual Urine Negative NEG^Negative     Cocaine Qual Urine Negative NEG^Negative     Ethanol Qual Urine Negative NEG^Negative     Opiates Qualitative Urine Negative NEG^Negative   Asymptomatic COVID-19 Virus (Coronavirus) by PCR     Specimen: Nasopharyngeal   Result Value Ref Range     COVID-19 Virus PCR to [facility] - Source Nasopharyngeal       COVID-19 Virus PCR to [facility] - Result           Test received-See reflex to IDDL test SARS CoV2 (COVID-19) Virus RT-PCR   SARS-CoV-2 COVID-19 Virus (Coronavirus) RT-PCR Nasopharyngeal     Specimen: Nasopharyngeal   Result Value Ref Range     SARS-CoV-2 Virus Specimen Source Nasopharyngeal       SARS-CoV-2 PCR Result NEGATIVE       SARS-CoV-2 PCR Comment           This automated, real-time RT-PCR assay by [employer] on the [employer] has   been  given Emergency Use Authorization (EUA) for the in vitro qualitative detection of RNA   from the SARS-CoV2 virus in nasopharyngeal swabs in viral transport medium from patients   with signs and symptoms of infection who are suspected of COVID-19. The performance is   unknown in asymptomatic patients.         TSH on 5/5/20 was 1.8.  HIV, Hep C, Hep B, chlamydia, gonorrhea- all negative on 5/5/20             Discharge Medications:     Discharge Medication List as of 6/1/2020 11:12 AM      START taking these medications    Details   mineral oil-hydrophilic petrolatum (AQUAPHOR) external ointment Apply topically 2 times daily as needed for dry skinHistorical      QUEtiapine (SEROQUEL) 25 MG tablet Take 1-2 tablets (25-50 mg) by mouth 2 times daily as needed (anxiety or insomnia), Disp-60 tablet,R-0, E-Prescribe         STOP taking these medications       hydrOXYzine (VISTARIL) 25 MG capsule Comments:   Reason for Stopping:         triamcinolone (KENALOG) 0.025 % cream Comments:   Reason for Stopping:                 Justification for dual anti-psychotic use: not applicable       Psychiatric Examination:   Appearance:  awake, alert, adequately groomed and appeared as age stated  Attitude:  cooperative  Eye Contact:  good  Mood:  better- much less anxious  Affect:  appropriate and in normal range  Speech:  clear, coherent  Psychomotor Behavior:  no evidence of tardive dyskinesia, dystonia, or tics  Thought Process:  linear and goal oriented  Associations:  no loose associations  Thought Content:  no evidence of suicidal ideation or homicidal ideation and no evidence of psychotic thought  Insight:  fair, did seem to improve  Judgment:  fair  Oriented to:  time, person, and place  Attention Span and Concentration:  intact  Recent and Remote Memory:  intact  Fund of Knowledge: appropriate  Muscle Strength and Tone: normal  Gait and Station: Normal  Perception: no perceptual disturbances noted       Discharge Plan:  "  Psychiatry Follow-up: Please establish with a PCP for monitoring of HTN and establish with a therapist once insurance becomes active.    *No appointments have been arranged for patient as patient does not currently have active insurance and patient's sister is working on getting patient set up with follow up arrangements.         Attestation:  The patient has been seen and evaluated by me,  Yudith Verma NP         Discharge Services Provided:    40 minutes spent on discharge services, including:  Final examination of patient.  Review and discussion of Hospital stay.  Instructions for continued outpatient care/goals.  Preparation of discharge records.  Preparation of medications refills and new prescriptions.      Video Visit  John R Budinger is a 34 year old male who is being evaluated via a billable video visit.      The patient has been notified of following:     \"This video visit will be conducted via a call between you and your physician/provider. We have found that certain health care needs can be provided without the need for an in-person physical exam.  This service lets us provide the care you need with a video conversation.  If a prescription is necessary we can send it directly to your pharmacy.  If lab work is needed we can place an order for that and you can then stop by our lab to have the test done at a later time.    If during the course of the call the physician/provider feels a video visit is not appropriate, you will not be charged for this service.\"     Patient has given verbal consent for Video visit? Yes    Video Start Time: 1013    I have reviewed and updated the patient's Past Medical History, Social History, Family History and Medication List.      Video-Visit Details    Type of service:  Video Visit    Video End Time (time video stopped): 1020    Originating Location (pt. Location): [facility] inpatient behavioral health unit    Distant Location (provider location):  [facility] " remote provider    Mode of Communication:  Video Conference via [employer] [facility] liz

## 2021-01-04 ENCOUNTER — HEALTH MAINTENANCE LETTER (OUTPATIENT)
Age: 36
End: 2021-01-04

## 2021-10-10 ENCOUNTER — HEALTH MAINTENANCE LETTER (OUTPATIENT)
Age: 36
End: 2021-10-10

## 2022-01-30 ENCOUNTER — HEALTH MAINTENANCE LETTER (OUTPATIENT)
Age: 37
End: 2022-01-30

## 2022-09-24 ENCOUNTER — HEALTH MAINTENANCE LETTER (OUTPATIENT)
Age: 37
End: 2022-09-24

## 2023-05-08 ENCOUNTER — HEALTH MAINTENANCE LETTER (OUTPATIENT)
Age: 38
End: 2023-05-08